# Patient Record
Sex: MALE | Race: WHITE | NOT HISPANIC OR LATINO | Employment: FULL TIME | URBAN - METROPOLITAN AREA
[De-identification: names, ages, dates, MRNs, and addresses within clinical notes are randomized per-mention and may not be internally consistent; named-entity substitution may affect disease eponyms.]

---

## 2022-04-10 ENCOUNTER — HISTORICAL (OUTPATIENT)
Dept: ADMINISTRATIVE | Facility: HOSPITAL | Age: 47
End: 2022-04-10

## 2022-04-27 VITALS
DIASTOLIC BLOOD PRESSURE: 93 MMHG | WEIGHT: 164.88 LBS | SYSTOLIC BLOOD PRESSURE: 137 MMHG | HEIGHT: 75 IN | BODY MASS INDEX: 20.5 KG/M2

## 2022-08-19 DIAGNOSIS — E11.9 TYPE 2 DIABETES MELLITUS WITHOUT COMPLICATION, WITHOUT LONG-TERM CURRENT USE OF INSULIN: Primary | ICD-10-CM

## 2022-08-24 DIAGNOSIS — N48.6 PEYRONIE DISEASE: Primary | ICD-10-CM

## 2022-08-29 ENCOUNTER — CLINICAL SUPPORT (OUTPATIENT)
Dept: AUDIOLOGY | Facility: HOSPITAL | Age: 47
End: 2022-08-29
Payer: MEDICAID

## 2022-08-29 DIAGNOSIS — H93.13 TINNITUS, BILATERAL: ICD-10-CM

## 2022-08-29 DIAGNOSIS — H90.3 SENSORINEURAL HEARING LOSS, BILATERAL: Primary | ICD-10-CM

## 2022-08-29 PROCEDURE — 92567 TYMPANOMETRY: CPT | Performed by: AUDIOLOGIST

## 2022-08-29 PROCEDURE — 92557 COMPREHENSIVE HEARING TEST: CPT | Performed by: AUDIOLOGIST

## 2022-08-29 NOTE — PROGRESS NOTES
Hearing Evaluation        Patient History: Mr. Hendrix is in for a hearing evaluation due to a subjective decrease in hearing, onset unknown. He reports intermittent bilateral tinnitus. Vertigo and middle ear issues are denied at this time. All additional history is unremarkable.        Test Results:                    Pure Tone Testing:      Right ear:       Moderate rising to mild sensorineural hearing loss from 3k-8kHz. Speech reception threshold is in agreement with puretone findings.  Discrimination score of 100% is considered excellent.        Left ear:          Moderate rising to mild sensorineural hearing loss from 3k-8kHz. Speech reception threshold is in agreement with puretone findings.  Discrimination score of 100% is considered excellent.                                                                            Tympanometry:                                           Right ear:       Type 'A' tymp, normal middle ear pressure/function     Left ear:          Type 'A' tymp, normal middle ear pressure/function                                           Interpretations:      Behavioral test findings indicate a moderate rising to mild high frequency SNHL, bilaterally. Speech reception thresholds obtained at 15dB, AU, and are in agreement with puretone findings. Speech discrimination scores of 100%, AU, are considered excellent.  Immittance measures indicate normal middle ear pressure/function, bilaterally.            Recommendations:   1. Annual hearing evaluations

## 2023-05-30 RX ORDER — CLONIDINE HYDROCHLORIDE 0.1 MG/1
TABLET ORAL
COMMUNITY

## 2023-05-30 RX ORDER — IBUPROFEN 200 MG
TABLET ORAL
COMMUNITY
Start: 2021-12-10

## 2023-05-30 RX ORDER — LOSARTAN POTASSIUM 25 MG/1
TABLET ORAL
COMMUNITY

## 2023-05-30 RX ORDER — TRIAMCINOLONE ACETONIDE 1 MG/G
CREAM TOPICAL
COMMUNITY

## 2023-05-30 RX ORDER — KETOCONAZOLE 20 MG/ML
SHAMPOO, SUSPENSION TOPICAL
COMMUNITY

## 2023-05-30 RX ORDER — MIRTAZAPINE 15 MG/1
TABLET, FILM COATED ORAL
COMMUNITY

## 2023-05-30 RX ORDER — GABAPENTIN 100 MG/1
CAPSULE ORAL
COMMUNITY
End: 2023-11-01

## 2023-05-30 RX ORDER — METFORMIN HYDROCHLORIDE 500 MG/1
TABLET ORAL
COMMUNITY
End: 2023-11-01 | Stop reason: SDUPTHER

## 2023-05-30 RX ORDER — METFORMIN HYDROCHLORIDE 500 MG/5ML
SOLUTION ORAL
COMMUNITY
Start: 2020-12-29 | End: 2023-11-01

## 2023-05-30 RX ORDER — SUCRALFATE 1 G/1
TABLET ORAL
COMMUNITY

## 2023-05-30 RX ORDER — PANTOPRAZOLE SODIUM 40 MG/1
1 TABLET, DELAYED RELEASE ORAL
COMMUNITY

## 2023-05-30 RX ORDER — MELOXICAM 7.5 MG/1
TABLET ORAL
COMMUNITY

## 2023-05-30 RX ORDER — AMLODIPINE BESYLATE 10 MG/1
1 TABLET ORAL DAILY
COMMUNITY

## 2023-05-30 RX ORDER — INSULIN GLARGINE 100 [IU]/ML
INJECTION, SOLUTION SUBCUTANEOUS
COMMUNITY
Start: 2022-08-15

## 2023-05-30 RX ORDER — CLINDAMYCIN PHOSPHATE 1 G/10ML
GEL TOPICAL
COMMUNITY

## 2023-05-30 RX ORDER — LOSARTAN POTASSIUM 50 MG/1
TABLET ORAL
COMMUNITY

## 2023-05-30 RX ORDER — AMLODIPINE BESYLATE 5 MG/1
TABLET ORAL
COMMUNITY
Start: 2018-11-29

## 2023-05-30 RX ORDER — TIZANIDINE 4 MG/1
TABLET ORAL
COMMUNITY

## 2023-05-30 RX ORDER — DIVALPROEX SODIUM 250 MG/1
TABLET, DELAYED RELEASE ORAL
COMMUNITY

## 2023-05-30 RX ORDER — BUSPIRONE HYDROCHLORIDE 10 MG/1
TABLET ORAL
COMMUNITY

## 2023-05-30 RX ORDER — OXCARBAZEPINE 300 MG/1
TABLET, FILM COATED ORAL
COMMUNITY

## 2023-05-30 RX ORDER — LOSARTAN POTASSIUM 100 MG/1
TABLET ORAL
COMMUNITY
Start: 2018-11-29

## 2023-05-30 RX ORDER — GLIMEPIRIDE 2 MG/1
TABLET ORAL
COMMUNITY
Start: 2020-12-29 | End: 2023-06-01

## 2023-05-30 RX ORDER — CYPROHEPTADINE HYDROCHLORIDE 4 MG/1
1 TABLET ORAL
COMMUNITY

## 2023-05-30 RX ORDER — NALTREXONE HYDROCHLORIDE 50 MG/1
TABLET, FILM COATED ORAL
COMMUNITY

## 2023-06-01 ENCOUNTER — OFFICE VISIT (OUTPATIENT)
Dept: ENDOCRINOLOGY | Facility: CLINIC | Age: 48
End: 2023-06-01
Payer: MEDICAID

## 2023-06-01 VITALS
SYSTOLIC BLOOD PRESSURE: 103 MMHG | HEART RATE: 75 BPM | HEIGHT: 75 IN | TEMPERATURE: 98 F | DIASTOLIC BLOOD PRESSURE: 66 MMHG | WEIGHT: 155.19 LBS | BODY MASS INDEX: 19.29 KG/M2 | RESPIRATION RATE: 20 BRPM

## 2023-06-01 DIAGNOSIS — Z11.59 ENCOUNTER FOR HEPATITIS C SCREENING TEST FOR LOW RISK PATIENT: ICD-10-CM

## 2023-06-01 DIAGNOSIS — G62.9 NEUROPATHY: ICD-10-CM

## 2023-06-01 DIAGNOSIS — B20 HIV INFECTION, UNSPECIFIED SYMPTOM STATUS: ICD-10-CM

## 2023-06-01 DIAGNOSIS — Z13.29 THYROID DISORDER SCREENING: ICD-10-CM

## 2023-06-01 DIAGNOSIS — Z79.4 TYPE 2 DIABETES MELLITUS WITHOUT COMPLICATION, WITH LONG-TERM CURRENT USE OF INSULIN: Primary | ICD-10-CM

## 2023-06-01 DIAGNOSIS — E11.9 TYPE 2 DIABETES MELLITUS WITHOUT COMPLICATION, WITH LONG-TERM CURRENT USE OF INSULIN: Primary | ICD-10-CM

## 2023-06-01 LAB
ALBUMIN SERPL-MCNC: 4 G/DL (ref 3.5–5)
ALBUMIN/GLOB SERPL: 1.2 RATIO (ref 1.1–2)
ALP SERPL-CCNC: 76 UNIT/L (ref 40–150)
ALT SERPL-CCNC: 13 UNIT/L (ref 0–55)
AST SERPL-CCNC: 15 UNIT/L (ref 5–34)
BILIRUBIN DIRECT+TOT PNL SERPL-MCNC: 0.6 MG/DL
BUN SERPL-MCNC: 16.4 MG/DL (ref 8.9–20.6)
CALCIUM SERPL-MCNC: 9.8 MG/DL (ref 8.4–10.2)
CHLORIDE SERPL-SCNC: 103 MMOL/L (ref 98–107)
CHOLEST SERPL-MCNC: 148 MG/DL
CHOLEST/HDLC SERPL: 3 {RATIO} (ref 0–5)
CO2 SERPL-SCNC: 28 MMOL/L (ref 22–29)
CREAT SERPL-MCNC: 0.96 MG/DL (ref 0.73–1.18)
CREAT UR-MCNC: 34.4 MG/DL (ref 63–166)
GFR SERPLBLD CREATININE-BSD FMLA CKD-EPI: >60 MLS/MIN/1.73/M2
GLOBULIN SER-MCNC: 3.4 GM/DL (ref 2.4–3.5)
GLUCOSE SERPL-MCNC: 119 MG/DL (ref 74–100)
HAV IGM SERPL QL IA: NONREACTIVE
HBA1C MFR BLD: 6.1 %
HBV CORE IGM SERPL QL IA: NONREACTIVE
HBV SURFACE AG SERPL QL IA: NONREACTIVE
HCV AB SERPL QL IA: NONREACTIVE
HDLC SERPL-MCNC: 44 MG/DL (ref 35–60)
HIV 1+2 AB+HIV1 P24 AG SERPL QL IA: NONREACTIVE
LDLC SERPL CALC-MCNC: 91 MG/DL (ref 50–140)
MICROALBUMIN UR-MCNC: 8.7 UG/ML
MICROALBUMIN/CREAT RATIO PNL UR: 25.3 MG/GM CR (ref 0–30)
POTASSIUM SERPL-SCNC: 4 MMOL/L (ref 3.5–5.1)
PROT SERPL-MCNC: 7.4 GM/DL (ref 6.4–8.3)
SODIUM SERPL-SCNC: 139 MMOL/L (ref 136–145)
T4 FREE SERPL-MCNC: 1 NG/DL (ref 0.7–1.48)
TRIGL SERPL-MCNC: 64 MG/DL (ref 34–140)
TSH SERPL-ACNC: 0.82 UIU/ML (ref 0.35–4.94)
VLDLC SERPL CALC-MCNC: 13 MG/DL

## 2023-06-01 PROCEDURE — 3078F DIAST BP <80 MM HG: CPT | Mod: CPTII,,, | Performed by: NURSE PRACTITIONER

## 2023-06-01 PROCEDURE — 84443 ASSAY THYROID STIM HORMONE: CPT | Performed by: NURSE PRACTITIONER

## 2023-06-01 PROCEDURE — 80074 ACUTE HEPATITIS PANEL: CPT | Performed by: NURSE PRACTITIONER

## 2023-06-01 PROCEDURE — 3074F PR MOST RECENT SYSTOLIC BLOOD PRESSURE < 130 MM HG: ICD-10-PCS | Mod: CPTII,,, | Performed by: NURSE PRACTITIONER

## 2023-06-01 PROCEDURE — 95251 PR GLUCOSE MONITOR, 72 HOUR, PHYS INTERP: ICD-10-PCS | Mod: ,,, | Performed by: NURSE PRACTITIONER

## 2023-06-01 PROCEDURE — 4010F PR ACE/ARB THEARPY RXD/TAKEN: ICD-10-PCS | Mod: CPTII,,, | Performed by: NURSE PRACTITIONER

## 2023-06-01 PROCEDURE — 3044F HG A1C LEVEL LT 7.0%: CPT | Mod: CPTII,,, | Performed by: NURSE PRACTITIONER

## 2023-06-01 PROCEDURE — 1159F PR MEDICATION LIST DOCUMENTED IN MEDICAL RECORD: ICD-10-PCS | Mod: CPTII,,, | Performed by: NURSE PRACTITIONER

## 2023-06-01 PROCEDURE — 3008F BODY MASS INDEX DOCD: CPT | Mod: CPTII,,, | Performed by: NURSE PRACTITIONER

## 2023-06-01 PROCEDURE — 99215 OFFICE O/P EST HI 40 MIN: CPT | Mod: PBBFAC | Performed by: NURSE PRACTITIONER

## 2023-06-01 PROCEDURE — 82043 UR ALBUMIN QUANTITATIVE: CPT | Performed by: NURSE PRACTITIONER

## 2023-06-01 PROCEDURE — 4010F ACE/ARB THERAPY RXD/TAKEN: CPT | Mod: CPTII,,, | Performed by: NURSE PRACTITIONER

## 2023-06-01 PROCEDURE — 3044F PR MOST RECENT HEMOGLOBIN A1C LEVEL <7.0%: ICD-10-PCS | Mod: CPTII,,, | Performed by: NURSE PRACTITIONER

## 2023-06-01 PROCEDURE — 1160F RVW MEDS BY RX/DR IN RCRD: CPT | Mod: CPTII,,, | Performed by: NURSE PRACTITIONER

## 2023-06-01 PROCEDURE — 83036 HEMOGLOBIN GLYCOSYLATED A1C: CPT | Mod: PBBFAC | Performed by: NURSE PRACTITIONER

## 2023-06-01 PROCEDURE — 3008F PR BODY MASS INDEX (BMI) DOCUMENTED: ICD-10-PCS | Mod: CPTII,,, | Performed by: NURSE PRACTITIONER

## 2023-06-01 PROCEDURE — 87389 HIV-1 AG W/HIV-1&-2 AB AG IA: CPT | Performed by: NURSE PRACTITIONER

## 2023-06-01 PROCEDURE — 86341 ISLET CELL ANTIBODY: CPT | Performed by: NURSE PRACTITIONER

## 2023-06-01 PROCEDURE — 80053 COMPREHEN METABOLIC PANEL: CPT | Performed by: NURSE PRACTITIONER

## 2023-06-01 PROCEDURE — 95251 CONT GLUC MNTR ANALYSIS I&R: CPT | Mod: ,,, | Performed by: NURSE PRACTITIONER

## 2023-06-01 PROCEDURE — 84439 ASSAY OF FREE THYROXINE: CPT | Performed by: NURSE PRACTITIONER

## 2023-06-01 PROCEDURE — 84681 ASSAY OF C-PEPTIDE: CPT | Performed by: NURSE PRACTITIONER

## 2023-06-01 PROCEDURE — 3078F PR MOST RECENT DIASTOLIC BLOOD PRESSURE < 80 MM HG: ICD-10-PCS | Mod: CPTII,,, | Performed by: NURSE PRACTITIONER

## 2023-06-01 PROCEDURE — 99204 OFFICE O/P NEW MOD 45 MIN: CPT | Mod: S$PBB,,, | Performed by: NURSE PRACTITIONER

## 2023-06-01 PROCEDURE — 36415 COLL VENOUS BLD VENIPUNCTURE: CPT | Performed by: NURSE PRACTITIONER

## 2023-06-01 PROCEDURE — 99204 PR OFFICE/OUTPT VISIT, NEW, LEVL IV, 45-59 MIN: ICD-10-PCS | Mod: S$PBB,,, | Performed by: NURSE PRACTITIONER

## 2023-06-01 PROCEDURE — 3074F SYST BP LT 130 MM HG: CPT | Mod: CPTII,,, | Performed by: NURSE PRACTITIONER

## 2023-06-01 PROCEDURE — 80061 LIPID PANEL: CPT | Performed by: NURSE PRACTITIONER

## 2023-06-01 PROCEDURE — 1160F PR REVIEW ALL MEDS BY PRESCRIBER/CLIN PHARMACIST DOCUMENTED: ICD-10-PCS | Mod: CPTII,,, | Performed by: NURSE PRACTITIONER

## 2023-06-01 PROCEDURE — 1159F MED LIST DOCD IN RCRD: CPT | Mod: CPTII,,, | Performed by: NURSE PRACTITIONER

## 2023-06-01 NOTE — PROGRESS NOTES
Subjective     Patient ID: Chuckie Hendrix is a 47 y.o. male.    Chief Complaint: Diabetes    Endocrine clinic note 06/01/2023:  47-year-old male scheduled today for endocrine clinic as new patient referral for history of uncontrolled type 2 diabetes.  Uncontrolled type 2 diabetes on patient's referral A1c was 12.6 A1c today 6.1.  Patient states at the time that his A1c was 12.6 he would lost his Jennifer and started EM non ADA diet and eating less sweets his diabetes becoming uncontrolled.  He states recently he was restarted on a Dexcom and since restarting his Dexcom he states he is improved his diet and has gained control of his diabetes with A1c returning to 6.1.  Patient reports last week on vacation was more active and was having multiple hypoglycemic episodes.  Patient takes a sulfonylurea at nighttime once a day.  Neuropathy patient was recently started on gabapentin and states he is had significant improvement in symptoms.    Patient has a Dexcom interpretation 05/19/2023-06/01/2023.  Days with CGM data 93% 13/14 days.  Average glucose 149.  GM I 6.9.  1% very high, 20% high, 70% in range, <1% low, 0% very low.  Patient's best day average glucose 122, 93% in range patient's overall good glucose control occasionally patient has prandial spikes with insulin decreasing afterwards close to 70 no significant hypoglycemia is noted.     Patient has at goal was 78% in range with A1c at goal is 6.1 we will continue to monitor patient develops any hypoglycemia we will adjust patient's medication regimen.       Review of Systems   Constitutional:  Negative for activity change, appetite change, chills and fatigue.   HENT: Negative.  Negative for dental problem, hearing loss, rhinorrhea, sneezing and goiter.    Eyes: Negative.  Negative for photophobia and visual disturbance.   Respiratory: Negative.  Negative for cough, chest tightness and shortness of breath.    Cardiovascular:  Negative for chest pain, palpitations and  leg swelling.   Gastrointestinal: Negative.  Negative for abdominal distention, abdominal pain, change in bowel habit, constipation, diarrhea, nausea, vomiting and change in bowel habit.   Endocrine: Negative.  Negative for cold intolerance, heat intolerance, polydipsia, polyphagia and polyuria.   Genitourinary: Negative.  Negative for difficulty urinating and erectile dysfunction.   Musculoskeletal:  Negative for back pain, joint swelling, leg pain, myalgias and joint deformity.   Integumentary:  Negative for color change, pallor and rash. Negative.   Allergic/Immunologic: Negative.  Negative for environmental allergies, food allergies and frequent infections.   Neurological: Negative.  Negative for tremors, syncope, headaches, coordination difficulties and coordination difficulties.   Hematological: Negative.  Does not bruise/bleed easily.   Psychiatric/Behavioral:  Negative for agitation and behavioral problems. The patient is not nervous/anxious and is not hyperactive.         Objective     Physical Exam  Constitutional:       General: He is not in acute distress.     Appearance: Normal appearance. He is normal weight. He is not ill-appearing.   HENT:      Head: Normocephalic.      Right Ear: External ear normal.      Left Ear: External ear normal.      Nose: No congestion or rhinorrhea.      Mouth/Throat:      Mouth: Mucous membranes are moist.      Pharynx: Oropharynx is clear.   Eyes:      Conjunctiva/sclera: Conjunctivae normal.      Pupils: Pupils are equal, round, and reactive to light.   Neck:      Thyroid: No thyroid mass, thyromegaly or thyroid tenderness.   Cardiovascular:      Rate and Rhythm: Normal rate and regular rhythm.      Pulses: Normal pulses.      Heart sounds: Normal heart sounds. No murmur heard.  Pulmonary:      Effort: Pulmonary effort is normal. No respiratory distress.      Breath sounds: Normal breath sounds.   Abdominal:      General: Abdomen is flat. Bowel sounds are normal. There  is no distension.      Palpations: Abdomen is soft.      Tenderness: There is no abdominal tenderness.   Genitourinary:     Testes: Normal.   Musculoskeletal:         General: Normal range of motion.      Cervical back: Normal range of motion and neck supple.      Right lower leg: No edema.      Left lower leg: No edema.   Feet:      Right foot:      Skin integrity: Skin integrity normal.      Left foot:      Skin integrity: Skin integrity normal.   Lymphadenopathy:      Cervical: No cervical adenopathy.   Skin:     General: Skin is warm and dry.      Coloration: Skin is not jaundiced or pale.   Neurological:      General: No focal deficit present.      Mental Status: He is alert and oriented to person, place, and time.      Motor: No weakness.      Coordination: Coordination normal.      Gait: Gait normal.   Psychiatric:         Mood and Affect: Mood normal.         Behavior: Behavior normal.         Thought Content: Thought content normal.         Judgment: Judgment normal.          Assessment and Plan     Uncontrolled type 2 diabetes mellitus with hyperglycemia  Current A1C 6.1 Previous A1C  12.6 on referral   A1C goal <7.0   Medications: Metformin 500 mg BID, Glimepiride 2 mg once a day, Lantus 10 units BID  Changes: DC glimepiride and Start Jardiance 10 mg   Follow ADA diet, avoid soda, simple sweets, and limit rice, breads and starches  Do not skip meals, eat balanced ADA approved meals   Maintain a healthy weight, exercise 4-5 times a week for 30 minutes  Diet and medication adherence discussed on visit  Call Clinic to report Hypoglycemia (CBG's <90)  Yearly Diabetic eye exam as recommended   Yearly flu shot, pneumonia shot as indicated   RTC 4 months   -     Ambulatory referral/consult to Endocrinology  -     C-Peptide; Future; Expected date: 06/01/2023  -     Hemoglobin A1C, POCT  -     Glutamic Acid Decarboxylase; Future; Expected date: 06/01/2023  -     Microalbumin/Creatinine Ratio, Urine  -      Ambulatory referral/consult to Diabetes Education; Future; Expected date: 06/08/2023  -     Comprehensive Metabolic Panel; Future; Expected date: 06/01/2023  -     Lipid Panel; Future; Expected date: 06/01/2023    Neuropathy  Continue Gabapentin     Encounter for hepatitis C screening test for low risk patient  -     Hepatitis Panel, Acute; Future; Expected date: 06/01/2023      HIV infection, unspecified symptom status  -     HIV 1/2 Ag/Ab (4th Gen); Future; Expected date: 06/01/2023    Thyroid disorder screening  -     TSH; Future; Expected date: 06/01/2023  -     T4, Free; Future; Expected date: 06/01/2023        I spent a total of 45 minutes on the day of the visit.  This includes face to face time and non-face to face time preparing to see the patient (eg, review of tests), obtaining and/or reviewing separately obtained history, documenting clinical information in the electronic or other health record, independently interpreting results and communicating results to the patient/family/caregiver, or care coordinator.

## 2023-06-02 LAB — PATH REV: NORMAL

## 2023-06-03 LAB — C PEPTIDE P FAST SERPL-MCNC: 1.2 NG/ML (ref 1.1–4.4)

## 2023-06-05 LAB — GAD65 AB SER-SCNC: NORMAL NMOL/L

## 2023-07-05 ENCOUNTER — CLINICAL SUPPORT (OUTPATIENT)
Dept: DIABETES | Facility: CLINIC | Age: 48
End: 2023-07-05
Payer: MEDICAID

## 2023-07-05 DIAGNOSIS — E11.9 TYPE 2 DIABETES MELLITUS WITHOUT COMPLICATION, WITH LONG-TERM CURRENT USE OF INSULIN: ICD-10-CM

## 2023-07-05 DIAGNOSIS — Z79.4 TYPE 2 DIABETES MELLITUS WITHOUT COMPLICATION, WITH LONG-TERM CURRENT USE OF INSULIN: ICD-10-CM

## 2023-07-05 PROCEDURE — 99212 OFFICE O/P EST SF 10 MIN: CPT | Mod: PBBFAC,PN | Performed by: DIETITIAN, REGISTERED

## 2023-07-05 PROCEDURE — G0108 DIAB MANAGE TRN  PER INDIV: HCPCS | Mod: PBBFAC,PN | Performed by: DIETITIAN, REGISTERED

## 2023-07-06 ENCOUNTER — PATIENT MESSAGE (OUTPATIENT)
Dept: DIABETES | Facility: CLINIC | Age: 48
End: 2023-07-06
Payer: MEDICAID

## 2023-07-06 VITALS — WEIGHT: 152.81 LBS | BODY MASS INDEX: 19.1 KG/M2

## 2023-07-06 NOTE — PROGRESS NOTES
Diabetes Care Specialist Progress Note  Author: Bernie Mcginnis RD  Date: 7/6/2023    Program Intake  Reason for Diabetes Program Visit:: Initial Diabetes Assessment  Current diabetes risk level:: low    No results found for: LABA1C, HGBA1C    Clinical         Problem Review  Reviewed Problem List with Patient: yes  Active comorbidities affecting diabetes self-care.: yes  Comorbidities: Hypertension    Clinical Assessment  Current Diabetes Treatment: Oral Medication  Have you ever experienced hypoglycemia (low blood sugar)?: yes  Are you able to tell when your blood sugar is low?: Yes  Have you ever experienced hyperglycemia (high blood sugar)?: yes  In the last month, how often have you experienced high blood sugar?: more than once a week    Medication Information  How do you obtain your medications?: Patient drives  How many days a week do you miss your medications?: Never  Do you sometimes have difficulty refilling your medications?: No  Medication adherence impacting ability to self-manage diabetes?: No    Labs  Do you have regular lab work to monitor your medications?: Yes  Lab Compliance Barriers: No    Nutritional Status  Diet: Regular  Change in appetite?: No  Current nutritional status an area of need that is impacting patient's ability to self-manage diabetes?: No    Additional Social History    Support  Does anyone support you with your diabetes care?: yes  Who supports you?: family member, self  Who takes you to your medical appointments?: self  Does the current support meet the patient's needs?: Yes  Is Support an area impacting ability to self-manage diabetes?: No    Access to Mass Media & Technology  Does the patient have access to any of the following devices or technologies?: Smart phone  Media or technology needs impacting ability to self-manage diabetes?: No    Cognitive/Behavioral Health  Alert and Oriented: Yes  Difficulty Thinking: No  Requires Prompting: No  Requires assistance for routine  expression?: No  Cognitive or behavioral barriers impacting ability to self-manage diabetes?: No         Communication  Language preference: English  Hearing Problems: No  Vision Problems: No  Communication needs impacting ability to self-manage diabetes?: No    Health Literacy  Preferred Learning Method: Face to Face  How often do you need to have someone help you read instructions, pamphlets, or written material from your doctor or pharmacy?: Never  Health literacy needs impacting ability to self-manage diabetes?: No      Diabetes Self-Management Skills Assessment    Diabetes Disease Process/Treatment Options  Diabetes Disease Process/Treatment Options: Skills Assessment Completed: No  Deferred due to:: Time    Nutrition/Healthy Eating  Nutrition/Healthy Eating Skills Assessment Completed:: No  Deffered due to:: Time    Physical Activity/Exercise  Physical Activity/Exercise Skills Assessment Completed: : No  Deffered due to:: Time    Medications  Patient is able to describe current diabetes management routine.: yes  Diabetes management routine:: oral medications  Patient is able to identify current diabetes medications, dosages, and appropriate timing of medications.: yes (Jardiance 10 mg and Metformin 500 mg BID)  Patient understands the purpose of the medications taken for diabetes.: no  Patient reports problems or concerns with current medication regimen.: no  Medication Skills Assessment Completed:: Yes  Assessment indicates:: Instruction Needed  Area of need?: Yes    Home Blood Glucose Monitoring  Patient states that blood sugar is checked at home daily.: yes  Monitoring Method:: personal continuous glucose monitor  Personal CGM type:: Dexcom G7  Patient is able to use personal CGM appropriately.: yes  CGM Report reviewed?: yes      Home Blood Glucose Monitoring Skills Assessment Completed: : Yes  Assessment indicates:: Instruction Needed  Area of need?: Yes    Acute Complications  Acute Complications Skills  Assessment Completed: : No  Deffered due to:: Time    Chronic Complications  Chronic Complications Skills Assessment Completed: : No  Deferred due to:: Time    Psychosocial/Coping  Psychosocial/Coping Skills Assessment Completed: : No  Deffered due to:: Time      Diabetes Self Support Plan         Assessment Summary and Plan    Based on today's diabetes care assessment, the following areas of need were identified:      Social 7/5/2023   Support No   Access to Mass Media/Tech No   Cognitive/Behavioral Health No   Communication No   Health Literacy No        Clinical 7/5/2023   Medication Adherence No   Lab Compliance No   Nutritional Status No        Diabetes Self-Management Skills 7/5/2023   Medication Yes - Reviewed mechanism of action and potential side effects for Jardiance and Metformin. Encouraged to drink water with Jardiance. Holding Lantus and Glimepiride, as instructed, due to episodes of hypoglycemia.   Home Blood Glucose Monitoring Yes - Dexcom G7 DOWNLOAD: See media file for details. 75% glucoses are usually in target range. However, hyperglycemia noted after breakfast/dinner.  1% episodes of hypoglycemia noted in the early morning which is attributed to first G7 sensor placement which later failed/feel off. Reports asymptomatic and not further hypoglycemia noted.    Sensor usage: 93%  Average glucose: 156 mg/dL    4% CGM readings greater than 250 mg/dL  21% CGM readings between 181-250 mg/dL  75% CGM readings in target glucose range of  mg/dL   0% CGM readings between 54-79%  1% CGM readings less than 54 mg/dL           Today's interventions were provided through individual discussion, instruction, and written materials were provided.      Patient verbalized understanding of instruction and written materials.  Pt was able to return back demonstration of instructions today. Patient understood key points, needs reinforcement and further instruction.     Diabetes Self-Management Care Plan:    Today's  Diabetes Self-Management Care Plan was developed with Chuckie's input. Chuckie has agreed to work toward the following goal(s) to improve his/her overall diabetes control.      Care Plan: Diabetes Management   Updates made since 6/6/2023 12:00 AM        Problem: Blood Glucose Self-Monitoring         Goal: Patient agrees to enter intake into Dexcom wild    Start Date: 7/6/2023   Expected End Date: 11/1/2023   Priority: Medium   Barriers: No Barriers Identified   Note:    7/6/23 - reviewed how to enter intake into Dexcom G7 wild via notes to monitor effects of diet on blood glucose to avoid spikes.         Follow Up Plan     Follow up if symptoms worsen or fail to improve.    Today's care plan and follow up schedule was discussed with patient.  Chuckie verbalized understanding of the care plan, goals, and agrees to follow up plan.        The patient was encouraged to communicate with his/her health care provider/physician and care team regarding his/her condition(s) and treatment.  I provided the patient with my contact information today and encouraged to contact me via phone or [facility] Patient Portal as needed.     Length of Visit   Total Time: 30 Minutes

## 2023-11-01 ENCOUNTER — OFFICE VISIT (OUTPATIENT)
Dept: ENDOCRINOLOGY | Facility: CLINIC | Age: 48
End: 2023-11-01
Payer: MEDICAID

## 2023-11-01 VITALS
WEIGHT: 151 LBS | BODY MASS INDEX: 18.77 KG/M2 | HEART RATE: 73 BPM | SYSTOLIC BLOOD PRESSURE: 117 MMHG | HEIGHT: 75 IN | TEMPERATURE: 99 F | DIASTOLIC BLOOD PRESSURE: 82 MMHG

## 2023-11-01 DIAGNOSIS — Z79.4 TYPE 2 DIABETES MELLITUS WITHOUT COMPLICATION, WITH LONG-TERM CURRENT USE OF INSULIN: ICD-10-CM

## 2023-11-01 DIAGNOSIS — E11.9 TYPE 2 DIABETES MELLITUS WITHOUT COMPLICATION, WITH LONG-TERM CURRENT USE OF INSULIN: ICD-10-CM

## 2023-11-01 DIAGNOSIS — E11.9 TYPE 2 DIABETES MELLITUS WITHOUT COMPLICATION, WITHOUT LONG-TERM CURRENT USE OF INSULIN: Primary | ICD-10-CM

## 2023-11-01 LAB — HBA1C MFR BLD: 7.3 %

## 2023-11-01 PROCEDURE — 83036 HEMOGLOBIN GLYCOSYLATED A1C: CPT | Mod: PBBFAC | Performed by: NURSE PRACTITIONER

## 2023-11-01 PROCEDURE — 1159F MED LIST DOCD IN RCRD: CPT | Mod: CPTII,,, | Performed by: NURSE PRACTITIONER

## 2023-11-01 PROCEDURE — 3051F HG A1C>EQUAL 7.0%<8.0%: CPT | Mod: CPTII,,, | Performed by: NURSE PRACTITIONER

## 2023-11-01 PROCEDURE — 3074F PR MOST RECENT SYSTOLIC BLOOD PRESSURE < 130 MM HG: ICD-10-PCS | Mod: CPTII,,, | Performed by: NURSE PRACTITIONER

## 2023-11-01 PROCEDURE — 3066F PR DOCUMENTATION OF TREATMENT FOR NEPHROPATHY: ICD-10-PCS | Mod: CPTII,,, | Performed by: NURSE PRACTITIONER

## 2023-11-01 PROCEDURE — 4010F PR ACE/ARB THEARPY RXD/TAKEN: ICD-10-PCS | Mod: CPTII,,, | Performed by: NURSE PRACTITIONER

## 2023-11-01 PROCEDURE — 3008F BODY MASS INDEX DOCD: CPT | Mod: CPTII,,, | Performed by: NURSE PRACTITIONER

## 2023-11-01 PROCEDURE — 99214 OFFICE O/P EST MOD 30 MIN: CPT | Mod: PBBFAC | Performed by: NURSE PRACTITIONER

## 2023-11-01 PROCEDURE — 3051F PR MOST RECENT HEMOGLOBIN A1C LEVEL 7.0 - < 8.0%: ICD-10-PCS | Mod: CPTII,,, | Performed by: NURSE PRACTITIONER

## 2023-11-01 PROCEDURE — 99214 OFFICE O/P EST MOD 30 MIN: CPT | Mod: S$PBB,,, | Performed by: NURSE PRACTITIONER

## 2023-11-01 PROCEDURE — 1159F PR MEDICATION LIST DOCUMENTED IN MEDICAL RECORD: ICD-10-PCS | Mod: CPTII,,, | Performed by: NURSE PRACTITIONER

## 2023-11-01 PROCEDURE — 99214 PR OFFICE/OUTPT VISIT, EST, LEVL IV, 30-39 MIN: ICD-10-PCS | Mod: S$PBB,,, | Performed by: NURSE PRACTITIONER

## 2023-11-01 PROCEDURE — 3074F SYST BP LT 130 MM HG: CPT | Mod: CPTII,,, | Performed by: NURSE PRACTITIONER

## 2023-11-01 PROCEDURE — 3079F DIAST BP 80-89 MM HG: CPT | Mod: CPTII,,, | Performed by: NURSE PRACTITIONER

## 2023-11-01 PROCEDURE — 3008F PR BODY MASS INDEX (BMI) DOCUMENTED: ICD-10-PCS | Mod: CPTII,,, | Performed by: NURSE PRACTITIONER

## 2023-11-01 PROCEDURE — 3066F NEPHROPATHY DOC TX: CPT | Mod: CPTII,,, | Performed by: NURSE PRACTITIONER

## 2023-11-01 PROCEDURE — 95251 PR GLUCOSE MONITOR, 72 HOUR, PHYS INTERP: ICD-10-PCS | Mod: ,,, | Performed by: NURSE PRACTITIONER

## 2023-11-01 PROCEDURE — 4010F ACE/ARB THERAPY RXD/TAKEN: CPT | Mod: CPTII,,, | Performed by: NURSE PRACTITIONER

## 2023-11-01 PROCEDURE — 3061F PR NEG MICROALBUMINURIA RESULT DOCUMENTED/REVIEW: ICD-10-PCS | Mod: CPTII,,, | Performed by: NURSE PRACTITIONER

## 2023-11-01 PROCEDURE — 95251 CONT GLUC MNTR ANALYSIS I&R: CPT | Mod: ,,, | Performed by: NURSE PRACTITIONER

## 2023-11-01 PROCEDURE — 3061F NEG MICROALBUMINURIA REV: CPT | Mod: CPTII,,, | Performed by: NURSE PRACTITIONER

## 2023-11-01 PROCEDURE — 3079F PR MOST RECENT DIASTOLIC BLOOD PRESSURE 80-89 MM HG: ICD-10-PCS | Mod: CPTII,,, | Performed by: NURSE PRACTITIONER

## 2023-11-01 RX ORDER — METFORMIN HYDROCHLORIDE 500 MG/1
TABLET ORAL
Qty: 180 TABLET | Refills: 3 | Status: SHIPPED | OUTPATIENT
Start: 2023-11-01

## 2023-11-01 RX ORDER — GLIMEPIRIDE 2 MG/1
2 TABLET ORAL EVERY MORNING
Qty: 90 TABLET | Refills: 3 | Status: SHIPPED | OUTPATIENT
Start: 2023-11-01

## 2023-11-01 RX ORDER — GABAPENTIN 300 MG/1
300 CAPSULE ORAL 3 TIMES DAILY
Qty: 270 CAPSULE | Refills: 3 | Status: SHIPPED | OUTPATIENT
Start: 2023-11-01

## 2023-11-01 RX ORDER — GABAPENTIN 300 MG/1
300 CAPSULE ORAL 2 TIMES DAILY
COMMUNITY
Start: 2023-10-16 | End: 2023-11-01

## 2023-11-01 RX ORDER — GLIMEPIRIDE 2 MG/1
2 TABLET ORAL EVERY MORNING
COMMUNITY
Start: 2023-07-11 | End: 2023-11-01 | Stop reason: SDUPTHER

## 2023-11-01 NOTE — PROGRESS NOTES
Subjective     Patient ID: Chuckie Hendrix is a 48 y.o. male.    Chief Complaint: No chief complaint on file.    Endocrine clinic note 06/01/2023:  47-year-old male scheduled today for endocrine clinic as new patient referral for history of uncontrolled type 2 diabetes.  Uncontrolled type 2 diabetes on patient's referral A1c was 12.6 A1c today 6.1.  Patient states at the time that his A1c was 12.6 he would lost his Jennifer and started EM non ADA diet and eating less sweets his diabetes becoming uncontrolled.  He states recently he was restarted on a Dexcom and since restarting his Dexcom he states he is improved his diet and has gained control of his diabetes with A1c returning to 6.1.  Patient reports last week on vacation was more active and was having multiple hypoglycemic episodes.  Patient takes a sulfonylurea at nighttime once a day.  Neuropathy patient was recently started on gabapentin and states he is had significant improvement in symptoms. Patient has a Dexcom interpretation 05/19/2023-06/01/2023.  Days with CGM data 93% 13/14 days.  Average glucose 149.  GM I 6.9.  1% very high, 20% high, 70% in range, <1% low, 0% very low.  Patient's best day average glucose 122, 93% in range patient's overall good glucose control occasionally patient has prandial spikes with insulin decreasing afterwards close to 70 no significant hypoglycemia is noted. Patient has at goal was 78% in range with A1c at goal is 6.1 we will continue to monitor patient develops any hypoglycemia we will adjust patient's medication regimen.     Endocrine clinic note 11/01/2023:  48-year-old male scheduled today for endocrine clinic follow-up.  History of type 2 diabetes. Type 2 diabetes current A1c 7.3 previous A1c 6.1 and 12.6.     Patient has a Dexcom interpretation 10/19/2023-11/01/2023.  Days with CGM data 100% 14/14 days.  Time in range 21% very high, 33% high, 46% in range, 0% low, 0% very low.  Patient had a pattern nighttime and daytime  highs.  On patient's best day average glucose 175 with 67% in range.  Patient has prandial spikes at times to 3 or 400 indicating he is eating high carb meals or high sugar.  Other days patient has very little prandial spikes indicating he is eating an ADA diet.  No hypoglycemia is noted.  Patient is entering his meals on some days on 1 day with a prandial spikes patient has had nodules and a cookie with a 2nd prandial spikes to 300 patient had 2 donuts.  On another day where patient had a prandial spikes patient had a chicken sandwich with a pumpkin frosty.     Discussed with patient ADA diet.  Patient states he has been traveling more and eating out more.  Will restart patient's glimepiride 2 mg.          Review of Systems   Constitutional:  Negative for activity change, appetite change, chills and fatigue.   HENT: Negative.  Negative for dental problem, hearing loss, rhinorrhea, sneezing and goiter.    Eyes: Negative.  Negative for photophobia and visual disturbance.   Respiratory: Negative.  Negative for cough, chest tightness and shortness of breath.    Cardiovascular:  Negative for chest pain, palpitations and leg swelling.   Gastrointestinal: Negative.  Negative for abdominal distention, abdominal pain, change in bowel habit, constipation, diarrhea, nausea and vomiting.   Endocrine: Negative.  Negative for cold intolerance, heat intolerance, polydipsia, polyphagia and polyuria.   Genitourinary: Negative.  Negative for difficulty urinating and erectile dysfunction.   Musculoskeletal:  Negative for back pain, joint swelling, leg pain, myalgias and joint deformity.   Integumentary:  Negative for color change, pallor and rash. Negative.   Allergic/Immunologic: Negative.  Negative for environmental allergies, food allergies and frequent infections.   Neurological: Negative.  Negative for tremors, syncope, headaches and coordination difficulties.   Hematological: Negative.  Does not bruise/bleed easily.    Psychiatric/Behavioral:  Negative for agitation and behavioral problems. The patient is not nervous/anxious and is not hyperactive.           Objective     Physical Exam  Constitutional:       General: He is not in acute distress.     Appearance: Normal appearance. He is normal weight. He is not ill-appearing.   HENT:      Head: Normocephalic.      Right Ear: External ear normal.      Left Ear: External ear normal.      Nose: No congestion or rhinorrhea.      Mouth/Throat:      Mouth: Mucous membranes are moist.      Pharynx: Oropharynx is clear.   Eyes:      Conjunctiva/sclera: Conjunctivae normal.      Pupils: Pupils are equal, round, and reactive to light.   Neck:      Thyroid: No thyroid mass, thyromegaly or thyroid tenderness.   Cardiovascular:      Rate and Rhythm: Normal rate and regular rhythm.      Pulses: Normal pulses.      Heart sounds: Normal heart sounds. No murmur heard.  Pulmonary:      Effort: Pulmonary effort is normal. No respiratory distress.      Breath sounds: Normal breath sounds.   Abdominal:      General: Abdomen is flat. Bowel sounds are normal. There is no distension.      Palpations: Abdomen is soft.      Tenderness: There is no abdominal tenderness.   Genitourinary:     Testes: Normal.   Musculoskeletal:         General: Normal range of motion.      Cervical back: Normal range of motion and neck supple.      Right lower leg: No edema.      Left lower leg: No edema.   Feet:      Right foot:      Skin integrity: Skin integrity normal.      Left foot:      Skin integrity: Skin integrity normal.   Lymphadenopathy:      Cervical: No cervical adenopathy.   Skin:     General: Skin is warm and dry.      Coloration: Skin is not jaundiced or pale.   Neurological:      General: No focal deficit present.      Mental Status: He is alert and oriented to person, place, and time.      Motor: No weakness.      Coordination: Coordination normal.      Gait: Gait normal.   Psychiatric:         Mood and  Affect: Mood normal.         Behavior: Behavior normal.         Thought Content: Thought content normal.         Judgment: Judgment normal.            Assessment and Plan     1. Type 2 diabetes mellitus without complication, without long-term current use of insulin  Current A1C 7.3 Previous A1C 6.1 Previous A1C  12.6 on referral   A1C goal <7.0   Medications: Metformin 500 mg BID, Jardiance 10mg, Lantus 10 units BID  Changes: Restart Glimperide 2 mg    Follow ADA diet, avoid soda, simple sweets, and limit rice, breads and starches  Do not skip meals, eat balanced ADA approved meals   Maintain a healthy weight, exercise 4-5 times a week for 30 minutes  Diet and medication adherence discussed on visit  Call Clinic to report Hypoglycemia (CBG's <90)  Yearly Diabetic eye exam as recommended   Yearly flu shot, pneumonia shot as indicated   Component Ref Range & Units 5 mo ago   Hemoglobin A1C, POC % 6.1      Component Ref Range & Units 5 mo ago   C-Peptide, S 1.1 - 4.4 ng/mL 1.2      Component 5 mo ago   GAD65 Ab Assay, S 0.00 nmol/L    -     Hemoglobin A1C, POCT  -     glimepiride (AMARYL) 2 MG tablet; Take 1 tablet (2 mg total) by mouth every morning.  Dispense: 90 tablet; Refill: 3  -     gabapentin (NEURONTIN) 300 MG capsule; Take 1 capsule (300 mg total) by mouth 3 (three) times daily.  Dispense: 270 capsule; Refill: 3  -     metFORMIN (GLUCOPHAGE) 500 MG tablet; TAKE ONE TABLET BY MOUTH WITH MEALS TWICE DAILY  Dispense: 180 tablet; Refill: 3  -     empagliflozin (JARDIANCE) 10 mg tablet; Take 1 tablet (10 mg total) by mouth once daily.  Dispense: 90 tablet; Refill: 3        Follow up in about 6 months (around 5/1/2024) for Type 2 diabetes, w/ Jennifer, w/ Dexcom.    I spent a total of 30 minutes on the day of the visit.This includes face to face time and non-face to face time preparing to see the patient (eg, review of tests), obtaining and/or reviewing separately obtained history, documenting clinical information in  the electronic or other health record, independently interpreting results and communicating results to the patient/family/caregiver, or care coordinator.

## 2024-09-09 ENCOUNTER — OFFICE VISIT (OUTPATIENT)
Dept: ENDOCRINOLOGY | Facility: CLINIC | Age: 49
End: 2024-09-09
Payer: MEDICAID

## 2024-09-09 ENCOUNTER — HOSPITAL ENCOUNTER (OUTPATIENT)
Dept: RADIOLOGY | Facility: HOSPITAL | Age: 49
Discharge: HOME | End: 2024-09-09
Attending: NURSE PRACTITIONER
Payer: MEDICAID

## 2024-09-09 VITALS
SYSTOLIC BLOOD PRESSURE: 122 MMHG | WEIGHT: 145.81 LBS | RESPIRATION RATE: 12 BRPM | BODY MASS INDEX: 18.13 KG/M2 | HEART RATE: 87 BPM | TEMPERATURE: 98 F | DIASTOLIC BLOOD PRESSURE: 82 MMHG | HEIGHT: 75 IN

## 2024-09-09 DIAGNOSIS — Z79.4 TYPE 2 DIABETES MELLITUS WITHOUT COMPLICATION, WITH LONG-TERM CURRENT USE OF INSULIN: ICD-10-CM

## 2024-09-09 DIAGNOSIS — M54.30 SCIATICA, UNSPECIFIED LATERALITY: ICD-10-CM

## 2024-09-09 DIAGNOSIS — E11.9 TYPE 2 DIABETES MELLITUS WITHOUT COMPLICATION, WITH LONG-TERM CURRENT USE OF INSULIN: ICD-10-CM

## 2024-09-09 DIAGNOSIS — G62.9 NEUROPATHY: ICD-10-CM

## 2024-09-09 DIAGNOSIS — E11.9 TYPE 2 DIABETES MELLITUS WITHOUT COMPLICATION, WITHOUT LONG-TERM CURRENT USE OF INSULIN: Primary | ICD-10-CM

## 2024-09-09 LAB
CREAT UR-MCNC: 86.5 MG/DL (ref 63–166)
HBA1C MFR BLD: 8.8 %
MICROALBUMIN UR-MCNC: 6.9 UG/ML
MICROALBUMIN/CREAT RATIO PNL UR: 8 MG/GM CR (ref 0–30)

## 2024-09-09 PROCEDURE — 99214 OFFICE O/P EST MOD 30 MIN: CPT | Mod: PBBFAC,25 | Performed by: NURSE PRACTITIONER

## 2024-09-09 PROCEDURE — 3008F BODY MASS INDEX DOCD: CPT | Mod: CPTII,,, | Performed by: NURSE PRACTITIONER

## 2024-09-09 PROCEDURE — 95251 CONT GLUC MNTR ANALYSIS I&R: CPT | Mod: ,,, | Performed by: NURSE PRACTITIONER

## 2024-09-09 PROCEDURE — 72100 X-RAY EXAM L-S SPINE 2/3 VWS: CPT | Mod: TC

## 2024-09-09 PROCEDURE — 1159F MED LIST DOCD IN RCRD: CPT | Mod: CPTII,,, | Performed by: NURSE PRACTITIONER

## 2024-09-09 PROCEDURE — 3079F DIAST BP 80-89 MM HG: CPT | Mod: CPTII,,, | Performed by: NURSE PRACTITIONER

## 2024-09-09 PROCEDURE — 82043 UR ALBUMIN QUANTITATIVE: CPT | Performed by: NURSE PRACTITIONER

## 2024-09-09 PROCEDURE — 3066F NEPHROPATHY DOC TX: CPT | Mod: CPTII,,, | Performed by: NURSE PRACTITIONER

## 2024-09-09 PROCEDURE — 3074F SYST BP LT 130 MM HG: CPT | Mod: CPTII,,, | Performed by: NURSE PRACTITIONER

## 2024-09-09 PROCEDURE — 3061F NEG MICROALBUMINURIA REV: CPT | Mod: CPTII,,, | Performed by: NURSE PRACTITIONER

## 2024-09-09 PROCEDURE — 4010F ACE/ARB THERAPY RXD/TAKEN: CPT | Mod: CPTII,,, | Performed by: NURSE PRACTITIONER

## 2024-09-09 PROCEDURE — 99214 OFFICE O/P EST MOD 30 MIN: CPT | Mod: S$PBB,,, | Performed by: NURSE PRACTITIONER

## 2024-09-09 PROCEDURE — 1160F RVW MEDS BY RX/DR IN RCRD: CPT | Mod: CPTII,,, | Performed by: NURSE PRACTITIONER

## 2024-09-09 PROCEDURE — 3052F HG A1C>EQUAL 8.0%<EQUAL 9.0%: CPT | Mod: CPTII,,, | Performed by: NURSE PRACTITIONER

## 2024-09-09 PROCEDURE — 83036 HEMOGLOBIN GLYCOSYLATED A1C: CPT | Mod: PBBFAC | Performed by: NURSE PRACTITIONER

## 2024-09-09 RX ORDER — GABAPENTIN 600 MG/1
600 TABLET ORAL 2 TIMES DAILY
COMMUNITY

## 2024-09-09 RX ORDER — PEN NEEDLE, DIABETIC 30 GX3/16"
NEEDLE, DISPOSABLE MISCELLANEOUS
Qty: 50 EACH | Refills: 3 | Status: SHIPPED | OUTPATIENT
Start: 2024-09-09

## 2024-09-09 RX ORDER — GLIMEPIRIDE 2 MG/1
2 TABLET ORAL EVERY MORNING
Qty: 90 TABLET | Refills: 3 | Status: SHIPPED | OUTPATIENT
Start: 2024-09-09

## 2024-09-09 RX ORDER — LOSARTAN POTASSIUM 100 MG/1
100 TABLET ORAL DAILY
COMMUNITY

## 2024-09-09 RX ORDER — BLOOD-GLUCOSE SENSOR
EACH MISCELLANEOUS
COMMUNITY

## 2024-09-09 RX ORDER — METFORMIN HYDROCHLORIDE 500 MG/1
TABLET ORAL
Qty: 180 TABLET | Refills: 3 | Status: SHIPPED | OUTPATIENT
Start: 2024-09-09

## 2024-09-09 RX ORDER — INSULIN GLARGINE 100 [IU]/ML
10 INJECTION, SOLUTION SUBCUTANEOUS DAILY
Qty: 15 ML | Refills: 2 | Status: SHIPPED | OUTPATIENT
Start: 2024-09-09

## 2024-09-09 NOTE — PROGRESS NOTES
Subjective     Patient ID: Chuckie Hendrix is a 48 y.o. male.    Chief Complaint: Follow-up Type 2 diabetes mellitus    Endocrine clinic note 06/01/2023:  47-year-old male scheduled today for endocrine clinic as new patient referral for history of uncontrolled type 2 diabetes.  Uncontrolled type 2 diabetes on patient's referral A1c was 12.6 A1c today 6.1.  Patient states at the time that his A1c was 12.6 he would lost his Jennifer and started EM non ADA diet and eating less sweets his diabetes becoming uncontrolled.  He states recently he was restarted on a Dexcom and since restarting his Dexcom he states he is improved his diet and has gained control of his diabetes with A1c returning to 6.1.  Patient reports last week on vacation was more active and was having multiple hypoglycemic episodes.  Patient takes a sulfonylurea at nighttime once a day.  Neuropathy patient was recently started on gabapentin and states he is had significant improvement in symptoms. Patient has a Dexcom interpretation 05/19/2023-06/01/2023.  Days with CGM data 93% 13/14 days.  Average glucose 149.  GM I 6.9.  1% very high, 20% high, 70% in range, <1% low, 0% very low.  Patient's best day average glucose 122, 93% in range patient's overall good glucose control occasionally patient has prandial spikes with insulin decreasing afterwards close to 70 no significant hypoglycemia is noted. Patient has at goal was 78% in range with A1c at goal is 6.1 we will continue to monitor patient develops any hypoglycemia we will adjust patient's medication regimen.      Endocrine clinic note 11/01/2023:  48-year-old male scheduled today for endocrine clinic follow-up.  History of type 2 diabetes. Type 2 diabetes current A1c 7.3 previous A1c 6.1 and 12.6. Patient has a Dexcom interpretation 10/19/2023-11/01/2023.  Days with CGM data 100% 14/14 days.  Time in range 21% very high, 33% high, 46% in range, 0% low, 0% very low.  Patient had a pattern nighttime and  daytime highs.  On patient's best day average glucose 175 with 67% in range.  Patient has prandial spikes at times to 3 or 400 indicating he is eating high carb meals or high sugar.  Other days patient has very little prandial spikes indicating he is eating an ADA diet.  No hypoglycemia is noted.  Patient is entering his meals on some days on 1 day with a prandial spikes patient has had nodules and a cookie with a 2nd prandial spikes to 300 patient had 2 donuts.  On another day where patient had a prandial spikes patient had a chicken sandwich with a pumpkin frosty. Discussed with patient ADA diet.  Patient states he has been traveling more and eating out more.  Will restart patient's glimepiride 2 mg.         Endocrine clinic note 09/09/2024 :  48-year-old male scheduled today for endocrine clinic follow-up.  History of uncontrolled type 2 diabetes.  Current A1c 8.8.    Patient has Dexcom interpretation 08/27/2024-09/09/2024.  Days with CGM data 93% 13/14 days.  GM I 8.3 average glucose 208.  Time in range 22% very high, 45% high, 33% in range, 0% low, 0% very low.  Majority of patient's blood sugars are above goal throughout the day at times with slight prandial spikes no hypoglycemia is noted.  Patient was times has elevated blood glucose in the evening overnight.  Patient states he just recently started taking his glimepiride 2 mg twice a day at times he does not take at night.  Patient was due for a foot exam foot exam performed today see documentation also patient was due for a fundus photo we will complete fundus photo today.     Patient reports numbness in 1 foot that he states he wakes up in the morning in his completely numb.  Discussed with the patient will rule out whether this is neuropathy versus sciatica.      Review of Systems   Constitutional:  Negative for activity change, appetite change, chills and fatigue.   HENT: Negative.  Negative for dental problem, hearing loss, rhinorrhea, sneezing and  goiter.    Eyes: Negative.  Negative for photophobia and visual disturbance.   Respiratory: Negative.  Negative for cough, chest tightness and shortness of breath.    Cardiovascular:  Negative for chest pain, palpitations and leg swelling.   Gastrointestinal: Negative.  Negative for abdominal distention, abdominal pain, change in bowel habit, constipation, diarrhea, nausea and vomiting.   Endocrine: Negative.  Negative for cold intolerance, heat intolerance, polydipsia, polyphagia and polyuria.   Genitourinary: Negative.  Negative for difficulty urinating and erectile dysfunction.   Musculoskeletal:  Negative for back pain, joint swelling, leg pain, myalgias and joint deformity.   Integumentary:  Negative for color change, pallor and rash. Negative.   Allergic/Immunologic: Negative.  Negative for environmental allergies, food allergies and frequent infections.   Neurological: Negative.  Negative for tremors, syncope, headaches and coordination difficulties.   Hematological: Negative.  Does not bruise/bleed easily.   Psychiatric/Behavioral:  Negative for agitation and behavioral problems. The patient is not nervous/anxious and is not hyperactive.           Objective     Physical Exam  Constitutional:       General: He is not in acute distress.     Appearance: Normal appearance. He is normal weight. He is not ill-appearing.   HENT:      Head: Normocephalic.      Right Ear: External ear normal.      Left Ear: External ear normal.      Nose: No congestion or rhinorrhea.      Mouth/Throat:      Mouth: Mucous membranes are moist.      Pharynx: Oropharynx is clear.   Eyes:      Conjunctiva/sclera: Conjunctivae normal.      Pupils: Pupils are equal, round, and reactive to light.   Neck:      Thyroid: No thyroid mass, thyromegaly or thyroid tenderness.   Cardiovascular:      Rate and Rhythm: Normal rate and regular rhythm.      Pulses: Normal pulses.           Dorsalis pedis pulses are 2+ on the right side and 2+ on the left  side.        Posterior tibial pulses are 2+ on the right side and 2+ on the left side.      Heart sounds: Normal heart sounds. No murmur heard.  Pulmonary:      Effort: Pulmonary effort is normal. No respiratory distress.      Breath sounds: Normal breath sounds.   Abdominal:      General: Abdomen is flat. Bowel sounds are normal. There is no distension.      Palpations: Abdomen is soft.      Tenderness: There is no abdominal tenderness.   Genitourinary:     Testes: Normal.   Musculoskeletal:         General: Normal range of motion.      Cervical back: Normal range of motion and neck supple.      Right lower leg: No edema.      Left lower leg: No edema.   Feet:      Right foot:      Protective Sensation: 5 sites tested.  5 sites sensed.      Skin integrity: Skin integrity normal.      Toenail Condition: Right toenails are normal.      Left foot:      Protective Sensation: 5 sites tested.  5 sites sensed.      Skin integrity: Skin integrity normal.      Toenail Condition: Left toenails are normal.   Lymphadenopathy:      Cervical: No cervical adenopathy.   Skin:     General: Skin is warm and dry.      Coloration: Skin is not jaundiced or pale.   Neurological:      General: No focal deficit present.      Mental Status: He is alert and oriented to person, place, and time.      Motor: No weakness.      Coordination: Coordination normal.      Gait: Gait normal.   Psychiatric:         Mood and Affect: Mood normal.         Behavior: Behavior normal.         Thought Content: Thought content normal.         Judgment: Judgment normal.            Assessment and Plan     1. Type 2 diabetes mellitus without complication, without long-term current use of insulin  Current A1C 8.8    A1C goal <7.0   Medications: Metformin 500 mg BID, Jardiance 10 mg, Glimepiride 2 mg BID, Lantus 10 units   Changes: increase Jardiance 25 mg daily    Yearly Diabetic Eye Exam: 09/09/2024 Fundus  Yearly Diabetic Foot Exam: 09/09/2024   -     POCT  "HEMOGLOBIN A1C  -     metFORMIN (GLUCOPHAGE) 500 MG tablet; TAKE ONE TABLET BY MOUTH WITH MEALS TWICE DAILY  Dispense: 180 tablet; Refill: 3  -     glimepiride (AMARYL) 2 MG tablet; Take 1 tablet (2 mg total) by mouth every morning.  Dispense: 90 tablet; Refill: 3  -     empagliflozin (JARDIANCE) 25 mg tablet; Take 1 tablet (25 mg total) by mouth once daily.  Dispense: 30 tablet; Refill: 5  -     insulin glargine U-100, Lantus, (LANTUS SOLOSTAR U-100 INSULIN) 100 unit/mL (3 mL) InPn pen; Inject 10 Units into the skin once daily.  Dispense: 15 mL; Refill: 2  -     pen needle, diabetic 32 gauge x 5/32" Ndle; Use BD haven needle 1 times a day for insulin injection  Dispense: 50 each; Refill: 3         Component Ref Range & Units 10:23 10 mo ago 1 yr ago   Hemoglobin A1C, POC % 8.8 Abnormal  7.3 6.1          2. Neuropathy  Discussed controlling DM     3 Sciatica, unspecified laterality  X-ray of the lumbar spine today  -     X-Ray Lumbar Spine 2 Or 3 Views; Future; Expected date: 09/09/2024            Follow up in about 6 months (around 3/9/2025) for Type 2 diabetes, w/ Dexcom.    "

## 2024-11-12 ENCOUNTER — TELEPHONE (OUTPATIENT)
Dept: ENDOCRINOLOGY | Facility: CLINIC | Age: 49
End: 2024-11-12
Payer: COMMERCIAL

## 2024-11-12 DIAGNOSIS — E11.9 TYPE 2 DIABETES MELLITUS WITHOUT COMPLICATION, WITHOUT LONG-TERM CURRENT USE OF INSULIN: Primary | ICD-10-CM

## 2024-11-12 NOTE — TELEPHONE ENCOUNTER
Returned patient called , patient states that insurance does not cover Jardiance, Patient ask if you could recommend a new PCP, and patient request sensor samples.

## 2024-11-12 NOTE — TELEPHONE ENCOUNTER
Called and spoke to patient informed RX filled at [facility] pharmacy and to update insurance information with

## 2024-11-12 NOTE — TELEPHONE ENCOUNTER
----- Message from Narinder sent at 11/12/2024  9:10 AM CST -----  Patient of Opal     Patient would like to discuss changing his primary care physician and pharmacy.      Patient is also requesting refills on empagliflozin (JARDIANCE) 25 mg tablet and Dexcom      9:13  Thanks,

## 2024-11-12 NOTE — TELEPHONE ENCOUNTER
Pt needs to update insurance with  and I sent Jardiance to [facility] for their cash price

## 2025-03-24 ENCOUNTER — LAB VISIT (OUTPATIENT)
Dept: LAB | Facility: HOSPITAL | Age: 50
End: 2025-03-24
Attending: NURSE PRACTITIONER
Payer: COMMERCIAL

## 2025-03-24 ENCOUNTER — OFFICE VISIT (OUTPATIENT)
Dept: ENDOCRINOLOGY | Facility: CLINIC | Age: 50
End: 2025-03-24
Payer: COMMERCIAL

## 2025-03-24 VITALS
BODY MASS INDEX: 19.19 KG/M2 | HEART RATE: 76 BPM | WEIGHT: 154.31 LBS | TEMPERATURE: 98 F | RESPIRATION RATE: 18 BRPM | SYSTOLIC BLOOD PRESSURE: 126 MMHG | HEIGHT: 75 IN | DIASTOLIC BLOOD PRESSURE: 84 MMHG

## 2025-03-24 DIAGNOSIS — G62.9 NEUROPATHY: ICD-10-CM

## 2025-03-24 DIAGNOSIS — E11.9 TYPE 2 DIABETES MELLITUS WITHOUT COMPLICATION, WITHOUT LONG-TERM CURRENT USE OF INSULIN: ICD-10-CM

## 2025-03-24 DIAGNOSIS — E11.9 TYPE 2 DIABETES MELLITUS WITHOUT COMPLICATION, WITHOUT LONG-TERM CURRENT USE OF INSULIN: Primary | ICD-10-CM

## 2025-03-24 LAB
ANION GAP SERPL CALC-SCNC: 4 MEQ/L
BUN SERPL-MCNC: 19.1 MG/DL (ref 8.9–20.6)
CALCIUM SERPL-MCNC: 9.9 MG/DL (ref 8.4–10.2)
CHLORIDE SERPL-SCNC: 106 MMOL/L (ref 98–107)
CO2 SERPL-SCNC: 27 MMOL/L (ref 22–29)
CREAT SERPL-MCNC: 0.82 MG/DL (ref 0.72–1.25)
CREAT UR-MCNC: 53.4 MG/DL (ref 63–166)
CREAT/UREA NIT SERPL: 23
FOLATE SERPL-MCNC: 16.4 NG/ML (ref 7–31.4)
GFR SERPLBLD CREATININE-BSD FMLA CKD-EPI: >60 ML/MIN/1.73/M2
GLUCOSE SERPL-MCNC: 85 MG/DL (ref 74–100)
HBA1C MFR BLD: 7.2 %
MICROALBUMIN UR-MCNC: <5 UG/ML
MICROALBUMIN/CREAT RATIO PNL UR: ABNORMAL
POTASSIUM SERPL-SCNC: 3.6 MMOL/L (ref 3.5–5.1)
SODIUM SERPL-SCNC: 137 MMOL/L (ref 136–145)
VIT B12 SERPL-MCNC: 1624 PG/ML (ref 213–816)

## 2025-03-24 PROCEDURE — 83036 HEMOGLOBIN GLYCOSYLATED A1C: CPT | Mod: PBBFAC | Performed by: NURSE PRACTITIONER

## 2025-03-24 PROCEDURE — 82746 ASSAY OF FOLIC ACID SERUM: CPT

## 2025-03-24 PROCEDURE — 82607 VITAMIN B-12: CPT

## 2025-03-24 PROCEDURE — 82043 UR ALBUMIN QUANTITATIVE: CPT | Performed by: NURSE PRACTITIONER

## 2025-03-24 PROCEDURE — 36415 COLL VENOUS BLD VENIPUNCTURE: CPT

## 2025-03-24 PROCEDURE — 80048 BASIC METABOLIC PNL TOTAL CA: CPT

## 2025-03-24 PROCEDURE — 99214 OFFICE O/P EST MOD 30 MIN: CPT | Mod: PBBFAC | Performed by: NURSE PRACTITIONER

## 2025-03-24 RX ORDER — INSULIN GLARGINE 100 [IU]/ML
10 INJECTION, SOLUTION SUBCUTANEOUS DAILY
Qty: 15 ML | Refills: 2 | Status: SHIPPED | OUTPATIENT
Start: 2025-03-24

## 2025-03-24 NOTE — PROGRESS NOTES
Subjective     Patient ID: Chuckie Hendrix is a 49 y.o. male.    Chief Complaint: Diabetes    Endocrine clinic note 06/01/2023:  47-year-old male scheduled today for endocrine clinic as new patient referral for history of uncontrolled type 2 diabetes.  Uncontrolled type 2 diabetes on patient's referral A1c was 12.6 A1c today 6.1.  Patient states at the time that his A1c was 12.6 he would lost his Jennifer and started EM non ADA diet and eating less sweets his diabetes becoming uncontrolled.  He states recently he was restarted on a Dexcom and since restarting his Dexcom he states he is improved his diet and has gained control of his diabetes with A1c returning to 6.1.  Patient reports last week on vacation was more active and was having multiple hypoglycemic episodes.  Patient takes a sulfonylurea at nighttime once a day.  Neuropathy patient was recently started on gabapentin and states he is had significant improvement in symptoms. Patient has a Dexcom interpretation 05/19/2023-06/01/2023.  Days with CGM data 93% 13/14 days.  Average glucose 149.  GM I 6.9.  1% very high, 20% high, 70% in range, <1% low, 0% very low.  Patient's best day average glucose 122, 93% in range patient's overall good glucose control occasionally patient has prandial spikes with insulin decreasing afterwards close to 70 no significant hypoglycemia is noted. Patient has at goal was 78% in range with A1c at goal is 6.1 we will continue to monitor patient develops any hypoglycemia we will adjust patient's medication regimen.      Endocrine clinic note 11/01/2023:  48-year-old male scheduled today for endocrine clinic follow-up.  History of type 2 diabetes. Type 2 diabetes current A1c 7.3 previous A1c 6.1 and 12.6. Patient has a Dexcom interpretation 10/19/2023-11/01/2023.  Days with CGM data 100% 14/14 days.  Time in range 21% very high, 33% high, 46% in range, 0% low, 0% very low.  Patient had a pattern nighttime and daytime highs.  On patient's  best day average glucose 175 with 67% in range.  Patient has prandial spikes at times to 3 or 400 indicating he is eating high carb meals or high sugar.  Other days patient has very little prandial spikes indicating he is eating an ADA diet.  No hypoglycemia is noted.  Patient is entering his meals on some days on 1 day with a prandial spikes patient has had nodules and a cookie with a 2nd prandial spikes to 300 patient had 2 donuts.  On another day where patient had a prandial spikes patient had a chicken sandwich with a pumpkin frosty. Discussed with patient ADA diet.  Patient states he has been traveling more and eating out more.  Will restart patient's glimepiride 2 mg.         Endocrine clinic note 09/09/2024 :  48-year-old male scheduled today for endocrine clinic follow-up.  History of uncontrolled type 2 diabetes.  Current A1c 8.8.  Patient has Dexcom interpretation 08/27/2024-09/09/2024.  Days with CGM data 93% 13/14 days.  GM I 8.3 average glucose 208. Time in range 22% very high, 45% high, 33% in range, 0% low, 0% very low.  Majority of patient's blood sugars are above goal throughout the day at times with slight prandial spikes no hypoglycemia is noted.  Patient was times has elevated blood glucose in the evening overnight.Patient states he just recently started taking his glimepiride 2 mg twice a day at times he does not take at night.  Patient was due for a foot exam foot exam performed today see documentation also patient was due for a fundus photo we will complete fundus photo today. Patient reports numbness in 1 foot that he states he wakes up in the morning in his completely numb.  Discussed with the patient will rule out whether this is neuropathy versus sciatica.    Endocrine Clinic Note 03/24/2025: 49-year-old male scheduled today for endocrine clinic follow-up.  History of uncontrolled type 2 diabetes.  Current A1c 7.2 improved from previous 8.8.    Patient has Dexcom a interpretation  03/11/2025-03/24/2025.   Days with CGM data 96% 14/14 days.  GMI 7.6 average glucose 178.  Time in range 8% very high, 40% high, 52% in range, 0% low, 0% very low.On pts best day average glucose 162 with 71% within range. No hypoglycemia or severe excursions are noted.   Pt has only been taking glimepiride once a day. A few days a week he eats large dinner meals or snack on cookies. Instructed him to take glimepiride on those days.       Review of Systems   Constitutional:  Negative for activity change, appetite change, chills and fatigue.   HENT: Negative.  Negative for dental problem, hearing loss, rhinorrhea, sneezing and goiter.    Eyes: Negative.  Negative for photophobia and visual disturbance.   Respiratory: Negative.  Negative for cough, chest tightness and shortness of breath.    Cardiovascular:  Negative for chest pain, palpitations and leg swelling.   Gastrointestinal: Negative.  Negative for abdominal distention, abdominal pain, change in bowel habit, constipation, diarrhea, nausea and vomiting.   Endocrine: Negative.  Negative for cold intolerance, heat intolerance, polydipsia, polyphagia and polyuria.   Genitourinary: Negative.  Negative for difficulty urinating and erectile dysfunction.   Musculoskeletal:  Negative for back pain, joint swelling, leg pain, myalgias and joint deformity.   Integumentary:  Negative for color change, pallor and rash. Negative.   Allergic/Immunologic: Negative.  Negative for environmental allergies, food allergies and frequent infections.   Neurological: Negative.  Negative for tremors, syncope, headaches and coordination difficulties.   Hematological: Negative.  Does not bruise/bleed easily.   Psychiatric/Behavioral:  Negative for agitation and behavioral problems. The patient is not nervous/anxious and is not hyperactive.           Objective     Physical Exam  Constitutional:       General: He is not in acute distress.     Appearance: Normal appearance. He is normal  weight. He is not ill-appearing.   HENT:      Head: Normocephalic.      Right Ear: External ear normal.      Left Ear: External ear normal.      Nose: No congestion or rhinorrhea.      Mouth/Throat:      Mouth: Mucous membranes are moist.      Pharynx: Oropharynx is clear.   Eyes:      Conjunctiva/sclera: Conjunctivae normal.      Pupils: Pupils are equal, round, and reactive to light.   Neck:      Thyroid: No thyroid mass, thyromegaly or thyroid tenderness.   Cardiovascular:      Rate and Rhythm: Normal rate and regular rhythm.      Pulses: Normal pulses.      Heart sounds: Normal heart sounds. No murmur heard.  Pulmonary:      Effort: Pulmonary effort is normal. No respiratory distress.      Breath sounds: Normal breath sounds.   Abdominal:      General: Abdomen is flat. Bowel sounds are normal. There is no distension.      Palpations: Abdomen is soft.      Tenderness: There is no abdominal tenderness.   Genitourinary:     Testes: Normal.   Musculoskeletal:         General: Normal range of motion.      Cervical back: Normal range of motion and neck supple.      Right lower leg: No edema.      Left lower leg: No edema.   Feet:      Right foot:      Skin integrity: Skin integrity normal.      Left foot:      Skin integrity: Skin integrity normal.   Lymphadenopathy:      Cervical: No cervical adenopathy.   Skin:     General: Skin is warm and dry.      Coloration: Skin is not jaundiced or pale.   Neurological:      General: No focal deficit present.      Mental Status: He is alert and oriented to person, place, and time.      Motor: No weakness.      Coordination: Coordination normal.      Gait: Gait normal.   Psychiatric:         Mood and Affect: Mood normal.         Behavior: Behavior normal.         Thought Content: Thought content normal.         Judgment: Judgment normal.            Assessment and Plan     1. Type 2 diabetes mellitus without complication, without long-term current use of insulin  Current A1C 7.2    A1C goal <7.0   Medications: Metformin 500 mg BID, Jardiance 25 mg, Glimepiride 2 mg BID, Lantus 10 units   Changes:  Restart Lantus 10 units and use glimepiride in evening with large meals and snacking   Yearly Diabetic Eye Exam: 09/09/2024 Fundus  Yearly Diabetic Foot Exam: 09/09/2024   -     POCT HEMOGLOBIN A1C        Component  Ref Range & Units (hover) 6 mo ago  (9/9/24) 1 yr ago  (11/1/23) 1 yr ago  (6/1/23)   Hemoglobin A1C, POC 8.8 Abnormal  7.3 6.1      -     Basic Metabolic Panel; Future; Expected date: 03/24/2025  -     Microalbumin/Creatinine Ratio, Urine  -     Vitamin B12; Future; Expected date: 03/24/2025  -     Folate; Future; Expected date: 03/24/2025    2. Neuropathy  Currently on Gabapentin 600 mg BID         Follow up in about 6 months (around 9/24/2025) for Type 2 diabetes, w/ Dexcom.

## 2025-03-25 ENCOUNTER — RESULTS FOLLOW-UP (OUTPATIENT)
Dept: ENDOCRINOLOGY | Facility: CLINIC | Age: 50
End: 2025-03-25

## 2025-08-26 ENCOUNTER — TELEPHONE (OUTPATIENT)
Dept: ENDOCRINOLOGY | Facility: CLINIC | Age: 50
End: 2025-08-26
Payer: COMMERCIAL

## 2025-08-26 DIAGNOSIS — E11.9 TYPE 2 DIABETES MELLITUS WITHOUT COMPLICATION, WITHOUT LONG-TERM CURRENT USE OF INSULIN: ICD-10-CM

## 2025-08-28 RX ORDER — GLIMEPIRIDE 2 MG/1
2 TABLET ORAL 2 TIMES DAILY
Qty: 180 TABLET | Refills: 3 | Status: SHIPPED | OUTPATIENT
Start: 2025-08-28